# Patient Record
Sex: FEMALE | Race: WHITE
[De-identification: names, ages, dates, MRNs, and addresses within clinical notes are randomized per-mention and may not be internally consistent; named-entity substitution may affect disease eponyms.]

---

## 2018-01-09 ENCOUNTER — HOSPITAL ENCOUNTER (EMERGENCY)
Dept: HOSPITAL 4 - SED | Age: 53
LOS: 1 days | Discharge: HOME | End: 2018-01-10
Payer: MEDICAID

## 2018-01-09 VITALS — HEIGHT: 63 IN | BODY MASS INDEX: 21.26 KG/M2 | WEIGHT: 120 LBS

## 2018-01-09 VITALS — SYSTOLIC BLOOD PRESSURE: 158 MMHG
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
## 2018-01-09 DIAGNOSIS — F17.200: ICD-10-CM

## 2018-01-09 DIAGNOSIS — M54.2: Primary | ICD-10-CM
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
## 2018-01-09 PROCEDURE — 72072 X-RAY EXAM THORAC SPINE 3VWS: CPT

## 2018-01-09 PROCEDURE — 96372 THER/PROPH/DIAG INJ SC/IM: CPT

## 2018-01-09 PROCEDURE — 72040 X-RAY EXAM NECK SPINE 2-3 VW: CPT

## 2018-01-09 PROCEDURE — 99284 EMERGENCY DEPT VISIT MOD MDM: CPT

## 2018-01-09 RX ADMIN — DIAZEPAM ONE MG: 5 TABLET ORAL at 22:57

## 2018-01-09 RX ADMIN — KETOROLAC TROMETHAMINE ONE MG: 60 INJECTION, SOLUTION INTRAMUSCULAR at 22:58

## 2018-01-09 RX ADMIN — OXYCODONE HYDROCHLORIDE AND ACETAMINOPHEN ONE TAB: 5; 325 TABLET ORAL at 22:58

## 2018-01-09 NOTE — NUR
Patient AAOx3, ambulatory. Patient states having neck pain with pain scale 7/10 
since yesterday post-fall. Patient states she fell from her bed and denies KO. 
Patient denies injury and pain to any other areas of her body. Patient denies 
any other complaints.

## 2018-01-09 NOTE — NUR
Patient calmly resting in ER bed, no signs of distress noted. Vital signs 
within therapeutic range.

## 2018-01-09 NOTE — NUR
Patient triaged and placed in waiting room. VSS and patient appears in no acute 
distress at this time. Accompanied by family, awaiting available bed, and MD 
notified of need for MSE.

## 2018-01-09 NOTE — NUR
Patient to ER bed 7 to [facility] for evaluation. Side rails up.

Report given to IFEOMA COLLINS.

-------------------------------------------------------------------------------

Addendum: 01/09/18 at 2139 by [email or web address]

-------------------------------------------------------------------------------

GAVE REPORT TO IFEOMA ALBARADO

## 2018-01-10 VITALS — SYSTOLIC BLOOD PRESSURE: 150 MMHG

## 2018-01-10 RX ADMIN — KETOROLAC TROMETHAMINE ONE MG: 30 INJECTION, SOLUTION INTRAMUSCULAR; INTRAVENOUS at 03:09

## 2018-01-10 NOTE — NUR
Patient given written and verbal discharge instructions and verbalizes 
understanding.  ER MD discussed with patient the results and treatment 
provided. Patient in stable condition. ID arm band removed. 

Rx of norco, ibuprofen, and flexeril given. Patient educated on pain management 
and to follow up with PMD. Pain Scale 0/10.

Opportunity for questions provided and answered.

## 2018-01-11 ENCOUNTER — HOSPITAL ENCOUNTER (EMERGENCY)
Dept: HOSPITAL 4 - SED | Age: 53
Discharge: HOME | End: 2018-01-11
Payer: MEDICAID

## 2018-01-11 VITALS — HEIGHT: 63 IN | WEIGHT: 120 LBS | BODY MASS INDEX: 21.26 KG/M2

## 2018-01-11 VITALS — SYSTOLIC BLOOD PRESSURE: 168 MMHG

## 2018-01-11 VITALS — SYSTOLIC BLOOD PRESSURE: 173 MMHG

## 2018-01-11 DIAGNOSIS — M54.2: Primary | ICD-10-CM

## 2018-01-11 DIAGNOSIS — F17.200: ICD-10-CM

## 2018-01-11 PROCEDURE — 96372 THER/PROPH/DIAG INJ SC/IM: CPT

## 2018-01-11 PROCEDURE — 99284 EMERGENCY DEPT VISIT MOD MDM: CPT

## 2018-01-11 NOTE — NUR
Patient medicated with second dose of Morphine 4 mg IM for c/o neck pain. 
Patient reports some relief from first dose of morphine.

## 2018-01-11 NOTE — NUR
Patient to ER via triage with c/o neck pain, s/p non-syncopal fall yesterday. 
Patient was seen yesterday for same complaint, patient reports that pain is 
unrelieved by [employer] at home. Patient is awake, alert and oriented in no acute 
distress, moving all extremities. No neuro deficits noted. Awaiting evaluation 
by ER MD, will continue to observe and assess.

## 2018-01-11 NOTE — NUR
Patient reports that she has not taken any of her pain medication tonight, last 
time she took some pain medication was approximately 8 hours ago.

## 2018-01-11 NOTE — NUR
Patient given written and verbal discharge instructions and verbalizes 
understanding.  ER MD discussed with patient the results and treatment 
provided. Patient in stable condition. ID arm band removed.

Rx of Valium given. Patient educated on pain management and to follow up with 
PMD. Pain Scale 6/10. Pt to take pain medication at home. 

Opportunity for questions provided and answered. Pt allowed fiance to sign 
paperwork.

## 2018-08-26 ENCOUNTER — HOSPITAL ENCOUNTER (EMERGENCY)
Dept: HOSPITAL 4 - SED | Age: 53
LOS: 1 days | Discharge: HOME | End: 2018-08-27
Payer: MEDICAID

## 2018-08-26 VITALS — BODY MASS INDEX: 21.26 KG/M2 | HEIGHT: 63 IN | WEIGHT: 120 LBS

## 2018-08-26 DIAGNOSIS — L98.499: Primary | ICD-10-CM

## 2018-08-26 DIAGNOSIS — I10: ICD-10-CM

## 2018-08-26 DIAGNOSIS — M54.6: ICD-10-CM

## 2018-08-27 VITALS — SYSTOLIC BLOOD PRESSURE: 155 MMHG

## 2018-08-27 VITALS — SYSTOLIC BLOOD PRESSURE: 154 MMHG

## 2018-10-11 ENCOUNTER — HOSPITAL ENCOUNTER (EMERGENCY)
Dept: HOSPITAL 4 - SED | Age: 53
Discharge: HOME | End: 2018-10-11
Payer: MEDICAID

## 2018-10-11 VITALS — BODY MASS INDEX: 21.26 KG/M2 | HEIGHT: 63 IN | WEIGHT: 120 LBS

## 2018-10-11 VITALS — SYSTOLIC BLOOD PRESSURE: 147 MMHG

## 2018-10-11 VITALS — SYSTOLIC BLOOD PRESSURE: 141 MMHG
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
## 2018-10-11 DIAGNOSIS — F17.200: ICD-10-CM

## 2018-10-11 DIAGNOSIS — Z79.899: ICD-10-CM

## 2018-10-11 DIAGNOSIS — M79.18: Primary | ICD-10-CM
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
## 2018-10-11 DIAGNOSIS — I10: ICD-10-CM
